# Patient Record
Sex: FEMALE | Race: WHITE | NOT HISPANIC OR LATINO
[De-identification: names, ages, dates, MRNs, and addresses within clinical notes are randomized per-mention and may not be internally consistent; named-entity substitution may affect disease eponyms.]

---

## 2017-05-15 PROCEDURE — 82607 VITAMIN B-12: CPT | Performed by: FAMILY MEDICINE

## 2017-05-15 PROCEDURE — 82746 ASSAY OF FOLIC ACID SERUM: CPT | Performed by: FAMILY MEDICINE

## 2017-10-22 PROBLEM — F11.20 OPIATE DEPENDENCE (HCC): Status: ACTIVE | Noted: 2017-10-22

## 2017-10-23 PROBLEM — F33.2 SEVERE RECURRENT MAJOR DEPRESSION WITHOUT PSYCHOTIC FEATURES (HCC): Status: ACTIVE | Noted: 2017-10-23

## 2017-10-23 PROBLEM — F11.20 OPIOID USE DISORDER, SEVERE, DEPENDENCE (HCC): Status: ACTIVE | Noted: 2017-10-22

## 2018-02-23 ENCOUNTER — HOSPITAL (OUTPATIENT)
Dept: OTHER | Age: 23
End: 2018-02-23
Attending: EMERGENCY MEDICINE

## 2018-02-23 LAB
AMORPH SED URNS QL MICRO: ABNORMAL
ANALYZER ANC (IANC): NORMAL
ANION GAP SERPL CALC-SCNC: 12 MMOL/L (ref 10–20)
APPEARANCE UR: CLEAR
BACTERIA #/AREA URNS HPF: ABNORMAL /HPF
BASOPHILS # BLD: 0 THOUSAND/MCL (ref 0–0.3)
BASOPHILS NFR BLD: 0 %
BILIRUB UR QL: NEGATIVE
BUN SERPL-MCNC: 11 MG/DL (ref 6–20)
BUN/CREAT SERPL: 16 (ref 7–25)
C TRACH RRNA SPEC QL NAA+PROBE: NEGATIVE
CALCIUM SERPL-MCNC: 9.2 MG/DL (ref 8.4–10.2)
CAOX CRY URNS QL MICRO: ABNORMAL
CHLORIDE: 103 MMOL/L (ref 98–107)
CO2 SERPL-SCNC: 27 MMOL/L (ref 21–32)
COLOR UR: YELLOW
CREAT SERPL-MCNC: 0.7 MG/DL (ref 0.51–0.95)
DIFFERENTIAL METHOD BLD: NORMAL
EOSINOPHIL # BLD: 0.1 THOUSAND/MCL (ref 0.1–0.5)
EOSINOPHIL NFR BLD: 2 %
EPITH CASTS #/AREA URNS LPF: ABNORMAL /[LPF]
ERYTHROCYTE [DISTWIDTH] IN BLOOD: 13.2 % (ref 11–15)
FATTY CASTS #/AREA URNS LPF: ABNORMAL /[LPF]
GLUCOSE SERPL-MCNC: 96 MG/DL (ref 65–99)
GLUCOSE UR-MCNC: NEGATIVE MG/DL
GRAN CASTS #/AREA URNS LPF: ABNORMAL /[LPF]
HCG SERPL-ACNC: 159 MUNIT/ML
HEMATOCRIT: 41.6 % (ref 36–46.5)
HGB BLD-MCNC: 14.2 GM/DL (ref 12–15.5)
HGB UR QL: ABNORMAL
HYALINE CASTS #/AREA URNS LPF: ABNORMAL /LPF (ref 0–5)
KETONES UR-MCNC: NEGATIVE MG/DL
LEUKOCYTE ESTERASE UR QL STRIP: NEGATIVE
LYMPHOCYTES # BLD: 1.1 THOUSAND/MCL (ref 1–4.8)
LYMPHOCYTES NFR BLD: 17 %
MCH RBC QN AUTO: 27.3 PG (ref 26–34)
MCHC RBC AUTO-ENTMCNC: 34.1 GM/DL (ref 32–36.5)
MCV RBC AUTO: 80 FL (ref 78–100)
MICROSCOPIC (MT): ABNORMAL
MIXED CELL CASTS #/AREA URNS LPF: ABNORMAL /[LPF]
MONOCYTES # BLD: 0.4 THOUSAND/MCL (ref 0.3–0.9)
MONOCYTES NFR BLD: 6 %
MUCOUS THREADS URNS QL MICRO: PRESENT
N GONORRHOEA RRNA SPEC QL NAA+PROBE: NEGATIVE
NEUTROPHILS # BLD: 4.9 THOUSAND/MCL (ref 1.8–7.7)
NEUTROPHILS NFR BLD: 75 %
NEUTS SEG NFR BLD: NORMAL %
NITRITE UR QL: NEGATIVE
PERCENT NRBC: NORMAL
PH UR: 6 UNIT (ref 5–7)
PLATELET # BLD: 225 THOUSAND/MCL (ref 140–450)
POTASSIUM SERPL-SCNC: 3.8 MMOL/L (ref 3.4–5.1)
PROT UR QL: NEGATIVE MG/DL
RBC # BLD: 5.2 MILLION/MCL (ref 4–5.2)
RBC #/AREA URNS HPF: ABNORMAL /HPF (ref 0–3)
RBC CASTS #/AREA URNS LPF: ABNORMAL /[LPF]
RENAL EPI CELLS #/AREA URNS HPF: ABNORMAL /[HPF]
SODIUM SERPL-SCNC: 138 MMOL/L (ref 135–145)
SP GR UR: 1.01 (ref 1–1.03)
SPECIMEN SOURCE: ABNORMAL
SPECIMEN SOURCE: NORMAL
SPERM URNS QL MICRO: ABNORMAL
SQUAMOUS #/AREA URNS HPF: ABNORMAL /HPF (ref 0–5)
T VAGINALIS URNS QL MICRO: ABNORMAL
TRI-PHOS CRY URNS QL MICRO: ABNORMAL
URATE CRY URNS QL MICRO: ABNORMAL
URNS CMNT MICRO: ABNORMAL
UROBILINOGEN UR QL: 0.2 MG/DL (ref 0–1)
WAXY CASTS #/AREA URNS LPF: ABNORMAL /[LPF]
WBC # BLD: 6.5 THOUSAND/MCL (ref 4.2–11)
WBC #/AREA URNS HPF: ABNORMAL /HPF (ref 0–5)
WBC CASTS #/AREA URNS LPF: ABNORMAL /[LPF]
YEAST HYPHAE URNS QL MICRO: ABNORMAL
YEAST URNS QL MICRO: ABNORMAL

## 2019-11-14 PROBLEM — E88.819 INSULIN RESISTANCE: Status: ACTIVE | Noted: 2019-11-14

## 2019-11-14 PROBLEM — E88.81 INSULIN RESISTANCE: Status: ACTIVE | Noted: 2019-11-14

## 2024-01-04 ENCOUNTER — TELEMEDICINE (OUTPATIENT)
Dept: TELEHEALTH | Age: 29
End: 2024-01-04
Payer: COMMERCIAL

## 2024-01-04 DIAGNOSIS — R39.89 SUSPECTED UTI: Primary | ICD-10-CM

## 2024-01-04 PROCEDURE — 99203 OFFICE O/P NEW LOW 30 MIN: CPT | Performed by: PHYSICIAN ASSISTANT

## 2024-01-04 RX ORDER — BUPROPION HYDROCHLORIDE 300 MG/1
300 TABLET ORAL DAILY
COMMUNITY
Start: 2023-12-21

## 2024-01-04 RX ORDER — DEXTROAMPHETAMINE SACCHARATE, AMPHETAMINE ASPARTATE, DEXTROAMPHETAMINE SULFATE AND AMPHETAMINE SULFATE 5; 5; 5; 5 MG/1; MG/1; MG/1; MG/1
10 TABLET ORAL DAILY
COMMUNITY
Start: 2023-02-20

## 2024-01-04 RX ORDER — LISDEXAMFETAMINE DIMESYLATE 30 MG/1
CAPSULE ORAL
COMMUNITY
Start: 2023-02-04

## 2024-01-04 RX ORDER — BUPRENORPHINE AND NALOXONE 8; 2 MG/1; MG/1
FILM, SOLUBLE BUCCAL; SUBLINGUAL
COMMUNITY

## 2024-01-04 RX ORDER — BUPROPION HYDROCHLORIDE 150 MG/1
150 TABLET ORAL DAILY
COMMUNITY
Start: 2023-12-20

## 2024-01-04 RX ORDER — CEPHALEXIN 500 MG/1
500 CAPSULE ORAL 2 TIMES DAILY
Qty: 14 CAPSULE | Refills: 0 | Status: SHIPPED | OUTPATIENT
Start: 2024-01-04 | End: 2024-01-11

## 2024-01-04 NOTE — PROGRESS NOTES
Virtual/Telephone Check-In    Daniela Cline verbally consents to a Virtual/Telephone Check-In service on 01/04/24.  Patient has been referred to the [facility] website at [email or web address] to review the yearly Consent to Treat document.  Patient understands and accepts financial responsibility for any deductible, co-insurance and/or co-pays associated with this service.       Telehealth Verbal Consent   I conducted a telehealth visit with Daniela Cline today, 01/04/24, which was completed using two-way, real-time interactive audio and video communication. This has been done in good pavan to provide continuity of care in the best interest of the provider-patient relationship, due to the COVID -19 public health crisis/national emergency where restrictions of face-to-face office visits are ongoing. Every conscious effort was taken to allow for sufficient and adequate time to complete the visit.  The patient was made aware of the limitations of the telehealth visit, including treatment limitations as no physical exam could be performed.  The patient was advised to call 911 or to go to the ER in case there was an emergency.  The patient was also advised of the potential privacy & security concerns related to the telehealth platform.   The patient was made aware of where to find [facility]'s notice of privacy practices, telehealth consent form and other related consent forms and documents.  which are located on the [facility] website. The patient verbally agreed to telehealth consent form, related consents and the risks discussed.    Lastly, the patient confirmed that they were in [place].   Included in this visit, time may have been spent reviewing labs, medications, radiology tests and decision making. Appropriate medical decision-making and tests are ordered as detailed in the plan of care above.  Coding/billing information is submitted for this visit based on complexity of care and/or time spent for the visit.    CHIEF  COMPLAINT:     Chief Complaint   Patient presents with    Urinary Symptoms              HPI:   Daniela Cline is a 29 year old female who presents for a video visit.  Patient reports concerns for UTI.  Has had slight burning/tingling with urination since 12/24-12/25 which improved for a few days, then returned to only improve again.  Today woke up with more significant symptoms--now having urgency, dysuria, noted some pinkish urine with drops of blood.  LMP about 12/7/23.  Denies concerns for pregnancy.  Denies any back ache or belly pain.  No chills/fevers.      Current Outpatient Medications   Medication Sig Dispense Refill    amphetamine-dextroamphetamine 20 MG Oral Tab Take 10 mg by mouth daily.      buPROPion  MG Oral Tablet 24 Hr Take 1 tablet (300 mg total) by mouth daily.      buPROPion  MG Oral Tablet 24 Hr Take 1 tablet (150 mg total) by mouth daily.      VYVANSE 30 MG Oral Cap       buprenorphine-naloxone 8-2 MG Sublingual Film PLACE 1 FILM UNDER THE TONGUE ONCE A DAY      Prazosin HCl 2 MG Oral Cap Take 1 capsule (2 mg total) by mouth daily. 90 capsule 0      Past Medical History:   Diagnosis Date    Heroin addiction (HCC)     Menses painful       History reviewed. No pertinent surgical history.      Social History     Socioeconomic History    Marital status: Single   Tobacco Use    Smoking status: Every Day     Packs/day: 0.50     Years: 5.00     Additional pack years: 0.00     Total pack years: 2.50     Types: Cigarettes    Smokeless tobacco: Never   Substance and Sexual Activity    Alcohol use: Yes     Comment: socially    Drug use: Yes     Types: Cannabis, methamphetamine, IV, Heroin, Ecstasy, Opiods, benzodiazepines, Ketamine, Cocaine     Comment: pt is not using right now    Sexual activity: Yes     Birth control/protection: Condom     Comment: always         REVIEW OF SYSTEMS:   GENERAL: good fluid intake and appetite  SKIN: no rashes or abnormal skin lesions  GI: denies N/V/C or  abdominal pain      EXAM:   General: Alert, Well-appearing, and In no acute distress  Respiratory:   Speaking in full sentences comfortably  Normal work of breathing  Head: Normocephalic  Nose: No obvious nasal discharge.  Skin: No obvious rashes or lesions from what observed.     No results found for this or any previous visit (from the past 24 hour(s)).    ASSESSMENT AND PLAN:   Daniela Cline is a 29 year old female who presents with symptoms that are consistent with    ASSESSMENT:   Encounter Diagnosis   Name Primary?    Suspected UTI Yes       PLAN: Meds as below.  See patient Instructions.    Meds & Refills for this Visit:  Requested Prescriptions     Signed Prescriptions Disp Refills    cephalexin 500 MG Oral Cap 14 capsule 0     Sig: Take 1 capsule (500 mg total) by mouth 2 (two) times daily for 7 days.       Risks, benefits, and side effects of medication explained and discussed.    The patient indicates understanding of these issues and agrees to the plan.  The patient is asked to return if sx's persist or worsen.    Face to face time spent on Video Visit: 8  Total Time spent on visit including reviewing history, ordering labs/medication, patient examination and education: 15    Daniela Cline understands video visit evaluation is not a substitute for face-to-face examination or emergency care. Patient advised to go to ER or call 911 for worsening symptoms or acute distress.

## 2024-01-04 NOTE — PATIENT INSTRUCTIONS
Take antibiotic with a meal twice daily  Eat yogurt daily and/or take a probiotic for next 3-4 weeks  Aim to drink at least 60 ounces of water per day  You can take ibuprofen or acetaminophen to help with bladder pain/discomfort  You can also take OTC Azo or Urostat to help reduce urinary symptoms and pain until the antibiotic begins working.  This will discolor your urine, do not be alarmed if you have neon colored urine.  If you are not improving in 48 hours you must be seen in person so a urine sample can be obtained.

## 2025-01-30 ENCOUNTER — OFFICE VISIT (OUTPATIENT)
Dept: SURGERY | Facility: CLINIC | Age: 30
End: 2025-01-30
Payer: COMMERCIAL

## 2025-01-30 DIAGNOSIS — N30.90 RECURRENT CYSTITIS: Primary | ICD-10-CM

## 2025-01-30 DIAGNOSIS — R82.90 URINE FINDING: ICD-10-CM

## 2025-01-30 LAB
APPEARANCE: CLEAR
BILIRUBIN: NEGATIVE
GLUCOSE (URINE DIPSTICK): NEGATIVE MG/DL
KETONES (URINE DIPSTICK): NEGATIVE MG/DL
LEUKOCYTES: NEGATIVE
MULTISTIX LOT#: NORMAL NUMERIC
NITRITE, URINE: NEGATIVE
OCCULT BLOOD: NEGATIVE
PH, URINE: 6.5 (ref 4.5–8)
PROTEIN (URINE DIPSTICK): NEGATIVE MG/DL
SPECIFIC GRAVITY: 1 (ref 1–1.03)
UROBILINOGEN,SEMI-QN: 0.2 MG/DL (ref 0–1.9)

## 2025-01-30 PROCEDURE — 99203 OFFICE O/P NEW LOW 30 MIN: CPT | Performed by: PHYSICIAN ASSISTANT

## 2025-01-30 PROCEDURE — 81003 URINALYSIS AUTO W/O SCOPE: CPT | Performed by: PHYSICIAN ASSISTANT

## 2025-01-30 NOTE — PATIENT INSTRUCTIONS
Dietary Irritants    What you can do to help relieve your symptoms:    The purpose of this handout is to provide information that can help you discover what you are ingesting or doing that may be contributing to your recurrent irritative voiding symptoms and what steps you can take to relieve your discomfort.     Frequency, urgency, and pain on urination are symptoms of inflammation or irritation. These symptoms can be caused by bacterial infections or substances in the urine causing irritation to the lining of the bladder or urethra. Bacterial infections can be treated with antibiotics. But irritation of the bladder or urethra can results from other cause that will probably not respond to antibiotics.    What to do at the first sign of bladder or urethral discomfort:    The basic treatment for irritable bladder symptoms, whether induced by bacterial or nonbacterial irritants, is hydration. At the first sign of discomfort, start drinking 16 oz of water mixed with 1 teaspoon of baking soda. Then drink 8 oz of plain water every 20 minutes for the next 2-3 hours.     Repeat drinking 16 oz of water with baking soda in 3-4 hours. (Baking soda contains sodium and can cause fluid retention. If you have a history of high blood pressure, congestive heart failure, or renal disease, you should not use more than twice in 24 hours.)    You can take acetaminophen to relieve the pain (two extra strength or three regular strength tablets). Bladder analgesics such as phenazopyridine (Pyridium) may help and will not alter the results of a urine culture should the symptoms not be significantly relieved by diluting the urine and flushing out the bladder.     A warm bath to help muscles of the pelvic floor relax will also help lessen discomfort.     Before starting any antibiotic, a urine culture must be taken. If the conservative measures mentioned above are not helping, call the office to arrange for a urine culture. If the specimen  is contaminated with vaginal cells and bacteria and you are severely symptomatic, then a catheterized specimen should be obtained directly from your bladder to determine precisely whether bacterial infection is the cause of your symptoms.     Dietary irritants to the urinary tract to avoid:    Certain food can contribute to urinary frequency, urgency, and discomfort. If bladder symptoms are related to dietary factors, strict adherence to a diet that eliminated the food should bring significant relief in 10 days. Once you are feeling better, you can begin to add foods back into your diet one at a time. If the symptoms return, you will be able to identify the irritant. As you add foods back to your diet it is very important that you drink significant amounts of water. These foods are acidic and are considered irritants to the bladder. They should be avoided.    Alcoholic beverages  Apples and apple juice  Cantaloupe  Carbonated beverages  Chili and spicy foods  Citrus fruit  Chocolate  Coffee (including decaf)  Cranberries and juice  Grapes  Guava  Peaches  Pineapple  Plums  Strawberries  Sugar*  Tea  Tomatoes  Vit B Complex  Vinegar  *Some women report that sugar flares their symptoms.    Low acid fruit substitutions include apricots, papaya, pears, and watermelon. Coffee drinkers can drink Kava or other low-acid instant drinks. Tea drinks can substitute non-citrus herbal and sun brewed teas. Calcium carbonate co-buffered with calcium ascorbate can be substituted for Vitamin C.     Urinary tract infections can affect your general health and your quality of life.  Some UTI’s can be prevented.  Here are some suggestions that my help prevent frequent UTI’s.     Good Hygiene: Always wipe front to back.  Don’t use perfumed soaps.  Plain soaps like Ivory are the best.  Don’t use washcloths.  Place soap directly on your hands and clean the genital area.  Rinse thoroughly.  Soap can be very irritating to the vaginal  mucosa.     Urination: Urinate every 2-3 hours during the day.  Empty your bladder just before going to bed and  first thing when you wake up.  Make sure you go to the bathroom when you have a strong urge. Don't hover over the toilet to urinate.  The bladder may not be completely emptying when using this technique.  Sometimes you may need to \"double-void\".  After you finish urinating, stand up, then sit back down to urinate again.     Clothing: Wear cotton underwear. Change daily.  Avoid tight jeans.       [place]: Sometimes UTI’s are related to intercourse.  Wash genital area before and rinse afterwards.  Empty your bladder before and after intercourse.  Use of vaginal lubricants may be helpful.  Condoms and some lubricants may be irritating to the vaginal mucosa.  Spermicide should be avoided.  Talk to your doctor, you may require a suppressive antibiotic to take after intercourse.     Supplements: Take Vitamin C 500 or 1000mg daily.  Cranberry pills 400mg daily or if symptomatic 400mg two times per day.  Eat yogurt or consider taking a probiotic.  D-mannose 1 gram is another supplement that can help prevent infections.  This one is harder to find, but can be found at stores such as [employer], [employer], or a specialized vitamin store.  Fluid intake should be at least 48oz daily with the goal of 64oz daily.  Water is preferable.      Constipation: Constipation has been linked to UTI’s.  You should be having a soft BM daily.  Dietary fiber should be between 20-25 grams daily.  Flaxseed, All-Bran cereal, Fiber One bars, fruits and vegetables are a good sources of fiber.  Alternatively, Metamucil can be used daily.  Gentle laxatives and stool softeners may be added on a daily or as needed basis if necessary (Miralax, Colace, Pericolace).      Vaginal creams and moisturizers: It may be recommended you try vaginal creams, moisturizers or lubricants as a prevention method.  Over-the-Counter  products:  Replens:  1 applicator three times per week  Luvena prebiotic vaginal moisturizer and lubricant:  package of 6.  1 tube every three days at bedtime.  Helps restore pH balance, decrease vaginal dryness, odor and itchiness.  KY liquibeads  KY silk - moisture enhancer  MeAgain Vaginal Moisturizer.  8 per package.  Use 1 daily for 7 days then 1 daily two times per week.  Astroglide  Prescriptions:  Estrace Cream  Premarin Cream  E-string     Prescription medications: Your doctor may recommend daily or as needed prescription medications including antibiotics to prevent urinary tract infection as well.

## 2025-01-30 NOTE — PROGRESS NOTES
[facility], [facility], [place]    Urology Consult Note    History of Present Illness:   Patient is a 30 year old female who presents today for consultation from Dr. Tanner' office for recurrent cystitis.     She notes that she had an infection approximately 1 year ago. Since then often feels that she has a UTI prior to menses, 3 times. Was seen in January and February. January treated with antibiotic, February culture negative. Symptoms eventually resolved. Had symptoms again in November, but none currently. ?slight irritation.     Notes her hormones are irregular. Has visit with urogyne next month to discuss.     Fluids - typically water 30-40 oz, no soda/coffee, occasional tea  Bowels - regular    UA negative.     HISTORY:  Past Medical History:    Heroin addiction (HCC)    Menses painful      No past surgical history on file.   Family History   Problem Relation Age of Onset    Heart Disease Maternal Grandfather     Cancer Maternal Grandfather         Colon    Bipolar Disorder Maternal Grandfather     Substance Abuse Maternal Grandfather     Cancer Paternal Grandmother         Colon    Cancer Other     Alcohol and Other Disorders Associated Father     Heart Disease Maternal Grandmother     Depression Mother     Other (Other) Mother         fibromyalgia      Social History:   Social History     Socioeconomic History    Marital status: Single   Tobacco Use    Smoking status: Every Day     Current packs/day: 0.50     Average packs/day: 0.5 packs/day for 5.0 years (2.5 ttl pk-yrs)     Types: Cigarettes    Smokeless tobacco: Never   Substance and Sexual Activity    Alcohol use: Yes     Comment: socially    Drug use: Yes     Types: Cannabis, methamphetamine, IV, Heroin, Ecstasy, Opiods, benzodiazepines, Ketamine, Cocaine     Comment: pt is not using right now    Sexual activity: Yes     Birth control/protection: Condom     Comment: always        Allergies  Allergies[1]    Review of Systems:    A 10-point review of systems was completed and is negative other than as noted above.    Physical Exam:   There were no vitals taken for this visit.    GENERAL APPEARANCE: well developed, well nourished, in no acute distress  NEUROLOGIC: no localizing neurologic signs, alert and oriented x 3, converses appropriately  HEAD: atraumatic, normocephalic  EYES: sclera non-icteric  ORAL CAVITY: mucosa moist  NECK/THYROID: no obvious masses or goiter  LUNGS: non-labored breathing  EXTREMITIES: warm, well-perfused. No clubbing, cyanosis or edema.  SKIN: no obvious rashes    Results:     Laboratory Data:  Lab Results   Component Value Date    WBC 3.59 (L) 10/31/2019    HGB 13.7 10/31/2019     10/31/2019     Lab Results   Component Value Date     10/31/2019    K 4.20 10/31/2019     10/31/2019    CO2 27.3 10/31/2019    BUN 17.0 10/31/2019    GLU 99 10/31/2019    AST 17 10/23/2017    ALT 17 10/31/2019    TP 7.5 10/23/2017    ALB 3.5 10/23/2017    CA 9.1 10/31/2019       Urinalysis Results (last three years):  Recent Labs     01/30/25  1115   SPECGRAVITY 1.005   PHURINE 6.5   NITRITE Negative       Urine Culture Results (last three years):  No results found for: \"URINECUL\"    Imaging  No results found.      Impression:     Patient is a 30 year old female  who presents today for consultation from Dr. Tanner' office for recurrent cystitis.     We reviewed cystitis tx and prevention strategies at today's visit and I provided educational materials for this. Discussed dietery and behavioral issues including cranberry / extract pills / supplements, fluids, voiding technique, post coital hygiene. Double voiding, bending over after void to completely empty. I encouraged the patient to drink at least 40-60 oz water per day or enough to keep urine clear. I also reviewed dietary irritants and provided educational materials for this. We also discussed trying prn AZO for pain relief with plenty of  water.    Recommendations:  As above. Will obtain RBUS to ensure no other contributing. May benefit from estrace cream, but defer to urogynecology.    Thank you very much for this consult. Please call if there are any questions or concerns.     Jayda Ivy PA-C  Urology  [facility]    Date: 1/30/2025           [1] No Known Allergies

## 2025-01-31 ENCOUNTER — HOSPITAL ENCOUNTER (OUTPATIENT)
Dept: ULTRASOUND IMAGING | Age: 30
Discharge: HOME | End: 2025-01-31
Attending: PHYSICIAN ASSISTANT
Payer: COMMERCIAL

## 2025-01-31 DIAGNOSIS — N30.90 RECURRENT CYSTITIS: ICD-10-CM

## 2025-01-31 PROCEDURE — 76770 US EXAM ABDO BACK WALL COMP: CPT | Performed by: PHYSICIAN ASSISTANT
